# Patient Record
Sex: MALE | Race: WHITE | NOT HISPANIC OR LATINO | Employment: FULL TIME | URBAN - METROPOLITAN AREA
[De-identification: names, ages, dates, MRNs, and addresses within clinical notes are randomized per-mention and may not be internally consistent; named-entity substitution may affect disease eponyms.]

---

## 2023-11-03 PROBLEM — E66.9 OBESITY: Status: ACTIVE | Noted: 2023-11-03

## 2023-11-03 PROBLEM — E78.5 HYPERLIPIDEMIA: Status: ACTIVE | Noted: 2023-11-03

## 2023-11-03 PROBLEM — I25.84 CORONARY ARTERY CALCIFICATION: Status: ACTIVE | Noted: 2023-11-03

## 2023-11-03 PROBLEM — I25.10 CORONARY ARTERY CALCIFICATION: Status: ACTIVE | Noted: 2023-11-03

## 2023-11-03 PROBLEM — I10 ESSENTIAL HYPERTENSION: Status: ACTIVE | Noted: 2023-11-03

## 2023-11-03 PROBLEM — F41.9 ANXIETY: Status: ACTIVE | Noted: 2023-11-03

## 2023-11-03 PROBLEM — R79.89 ABNORMAL LIVER FUNCTION TEST: Status: ACTIVE | Noted: 2023-11-03

## 2023-11-03 RX ORDER — LISINOPRIL 20 MG/1
TABLET ORAL
COMMUNITY
Start: 2013-04-10

## 2023-11-03 RX ORDER — LEVOTHYROXINE SODIUM 25 UG/1
TABLET ORAL
COMMUNITY
Start: 2022-04-04

## 2023-11-03 RX ORDER — AMLODIPINE BESYLATE 10 MG/1
1 TABLET ORAL DAILY
COMMUNITY
Start: 2013-04-10

## 2023-11-03 RX ORDER — ATORVASTATIN CALCIUM 40 MG/1
1 TABLET, FILM COATED ORAL DAILY
COMMUNITY
Start: 2019-11-05

## 2023-11-03 RX ORDER — DOXAZOSIN 1 MG/1
1 TABLET ORAL DAILY
COMMUNITY
Start: 2021-03-08

## 2023-11-03 RX ORDER — OMEPRAZOLE 20 MG/1
TABLET, DELAYED RELEASE ORAL
COMMUNITY
Start: 2022-05-03

## 2023-11-03 RX ORDER — HYDROCHLOROTHIAZIDE 12.5 MG/1
1 CAPSULE ORAL DAILY
COMMUNITY
Start: 2020-11-23

## 2023-11-03 RX ORDER — AMMONIUM LACTATE 12 G/100G
LOTION TOPICAL
COMMUNITY
Start: 2022-04-04

## 2023-11-03 RX ORDER — GLUCOSAMINE/MSM/CHONDROIT SULF 500-166.6
1 TABLET ORAL DAILY
COMMUNITY
Start: 2019-11-05

## 2023-11-03 RX ORDER — CLONAZEPAM 0.5 MG/1
TABLET ORAL
COMMUNITY
Start: 2013-04-10

## 2023-11-03 RX ORDER — ASPIRIN 81 MG/1
1 TABLET ORAL DAILY
COMMUNITY
Start: 2019-11-05

## 2023-11-10 ENCOUNTER — OFFICE VISIT (OUTPATIENT)
Dept: HEMATOLOGY/ONCOLOGY | Facility: CLINIC | Age: 63
End: 2023-11-10
Payer: MEDICARE

## 2023-11-10 VITALS
HEIGHT: 65 IN | OXYGEN SATURATION: 97 % | BODY MASS INDEX: 31.88 KG/M2 | RESPIRATION RATE: 20 BRPM | TEMPERATURE: 98.2 F | SYSTOLIC BLOOD PRESSURE: 134 MMHG | DIASTOLIC BLOOD PRESSURE: 82 MMHG | WEIGHT: 191.36 LBS | HEART RATE: 80 BPM

## 2023-11-10 DIAGNOSIS — D47.2 MONOCLONAL GAMMOPATHY: Primary | ICD-10-CM

## 2023-11-10 PROCEDURE — 99214 OFFICE O/P EST MOD 30 MIN: CPT | Performed by: INTERNAL MEDICINE

## 2023-11-10 PROCEDURE — 1125F AMNT PAIN NOTED PAIN PRSNT: CPT | Performed by: INTERNAL MEDICINE

## 2023-11-10 PROCEDURE — 3075F SYST BP GE 130 - 139MM HG: CPT | Performed by: INTERNAL MEDICINE

## 2023-11-10 PROCEDURE — 1159F MED LIST DOCD IN RCRD: CPT | Performed by: INTERNAL MEDICINE

## 2023-11-10 PROCEDURE — 3079F DIAST BP 80-89 MM HG: CPT | Performed by: INTERNAL MEDICINE

## 2023-11-10 RX ORDER — LEVOTHYROXINE SODIUM 25 UG/1
TABLET ORAL DAILY
COMMUNITY

## 2023-11-10 ASSESSMENT — COLUMBIA-SUICIDE SEVERITY RATING SCALE - C-SSRS
2. HAVE YOU ACTUALLY HAD ANY THOUGHTS OF KILLING YOURSELF?: NO
6. HAVE YOU EVER DONE ANYTHING, STARTED TO DO ANYTHING, OR PREPARED TO DO ANYTHING TO END YOUR LIFE?: NO

## 2023-11-10 ASSESSMENT — PATIENT HEALTH QUESTIONNAIRE - PHQ9
1. LITTLE INTEREST OR PLEASURE IN DOING THINGS: NOT AT ALL
SUM OF ALL RESPONSES TO PHQ9 QUESTIONS 1 AND 2: 0
2. FEELING DOWN, DEPRESSED OR HOPELESS: NOT AT ALL

## 2023-11-10 ASSESSMENT — ENCOUNTER SYMPTOMS
LOSS OF SENSATION IN FEET: 0
DEPRESSION: 0
OCCASIONAL FEELINGS OF UNSTEADINESS: 0

## 2023-11-10 ASSESSMENT — PAIN SCALES - GENERAL: PAINLEVEL: 3

## 2023-11-10 NOTE — PROGRESS NOTES
LOCATION:    [facility] at [facility].     HEMATOLOGY & ONCOLOGY PROBLEMS:  1. Monoclonal gammopathy.      CHIEF COMPLAINT:    The patient is in the clinic today for management of monoclonal gammopathy.                   HISTORY:    Mr. Washington is a 63-year-old, pleasant gentleman with monoclonal gammopathy. He was noted to have 1.7 g of IgM kappa  monoclonal protein on SPEP in 2017.  CBC, metabolic profile, etc., were all normal.  Clinically, he has problem with generalized weakness and fatigue, but he attributes that mainly because of his history of anxiety.  No specific history of nausea,  vomiting, fever, diarrhea, rash, anorexia, or weight loss.     INTERVAL HISTORY:  Patient is being seen after a gap of 4 years. Overall he has been stable. Patient denies any new complaints. No history of nausea, vomiting, fever, night sweats, diarrhea, rash, anorexia or weight loss.  He has been followed very diligently by Dr. Howard and lately has been noted to have progressively rising kappa light chain level.  Latest blood work from 10/31/2023 showed a IgM level of 4226.  Free kappa light chain level is 31.91.  SPEP showed [place] faint IgM kappa monoclonal protein.  CBC and metabolic profile including calcium and creatinine have been unremarkable.  Complete skeletal survey was unremarkable on 2023.    PAST MEDICAL HISTORY:    1.  Hypertension  2.  Anxiety.  3.  Eczema.  4.  History of appendicectomy.    SOCIAL HISTORY:     and lives in [place]. His wife  in . They were  for 7 years.  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  Quit smoking 20 years ago.  Rare social alcohol intake.    FAMILY HISTORY:    Father  at age 84 from lymphoma.  Mother  at age 54 from myocardial infarction.  Four brothers, one of them  from drug overdose and two passed away due to cardiac issues.  His grandmother  at age 105 from cardiac-related  issues.  Two children in good health.      REVIEW OF SYSTEMS:    Pertinent finding as per the history above.  All other systems have been reviewed and generally negative and noncontributory.                                                                     PHYSICAL EXAMINATION:  Detailed examination not done.    Lab Results:  Latest blood work results at [facility] from 9/30/2023 were reviewed and have been detailed in the history above.  Most importantly his free kappa light chain level is elevated at 31.9.  It was 1.6 in 2019.    Assessment and Plan:    1.  IgG kappa monoclonal gammopathy. Please refer to the details of initial presentation and management as outlined above. In summary, the patient was noted  to have an incidental finding of 1.7 g of IgM kappa monoclonal protein. CBC, metabolic profile, calcium, etc., were all normal. Clinically, he is asymptomatic.      Clinically he is asymptomatic but lately his kappa light chain level has been noted to be progressively rising, as detailed above.  No evidence of any endorgan involvement with normal CBC, creatinine and calcium.  Skeletal survey was completely unremarkable on 9/30/2023.  I agree with Dr. Howard that he needs immediate further work-up.  I will schedule him for a  bone marrow biopsy for further evaluation.  I briefly reviewed the natural history of gammopathy including risk of transition from MGUS to myeloma.    2.  Follow-up.  He will return to the clinic immediately after the bone marrow biopsy.    This note has been transcribed using Dragon voice recognition system and there is a possibility of unintentional typing misprints.

## 2025-07-29 ENCOUNTER — LAB (OUTPATIENT)
Dept: LAB | Facility: CLINIC | Age: 65
End: 2025-07-29
Payer: MEDICARE

## 2025-07-29 DIAGNOSIS — D47.2 MONOCLONAL GAMMOPATHY: ICD-10-CM

## 2025-07-29 LAB
ALBUMIN SERPL BCP-MCNC: 4.2 G/DL (ref 3.4–5)
ALP SERPL-CCNC: 47 U/L (ref 33–136)
ALT SERPL W P-5'-P-CCNC: 45 U/L (ref 10–52)
ANION GAP SERPL CALC-SCNC: 14 MMOL/L (ref 10–20)
AST SERPL W P-5'-P-CCNC: 24 U/L (ref 9–39)
BASOPHILS # BLD AUTO: 0.08 X10*3/UL (ref 0–0.1)
BASOPHILS NFR BLD AUTO: 0.9 %
BILIRUB SERPL-MCNC: 0.6 MG/DL (ref 0–1.2)
BUN SERPL-MCNC: 10 MG/DL (ref 6–23)
CALCIUM SERPL-MCNC: 9.2 MG/DL (ref 8.6–10.3)
CHLORIDE SERPL-SCNC: 105 MMOL/L (ref 98–107)
CO2 SERPL-SCNC: 21 MMOL/L (ref 21–32)
CREAT SERPL-MCNC: 0.69 MG/DL (ref 0.5–1.3)
EGFRCR SERPLBLD CKD-EPI 2021: >90 ML/MIN/1.73M*2
EOSINOPHIL # BLD AUTO: 0.21 X10*3/UL (ref 0–0.7)
EOSINOPHIL NFR BLD AUTO: 2.4 %
ERYTHROCYTE [DISTWIDTH] IN BLOOD BY AUTOMATED COUNT: 13.6 % (ref 11.5–14.5)
GLUCOSE SERPL-MCNC: 101 MG/DL (ref 74–99)
HCT VFR BLD AUTO: 40 % (ref 41–52)
HGB BLD-MCNC: 14 G/DL (ref 13.5–17.5)
IGA SERPL-MCNC: 123 MG/DL (ref 70–400)
IGG SERPL-MCNC: 692 MG/DL (ref 700–1600)
IGM SERPL-MCNC: 3530 MG/DL (ref 40–230)
IMM GRANULOCYTES # BLD AUTO: 0.04 X10*3/UL (ref 0–0.7)
IMM GRANULOCYTES NFR BLD AUTO: 0.4 % (ref 0–0.9)
LYMPHOCYTES # BLD AUTO: 1.41 X10*3/UL (ref 1.2–4.8)
LYMPHOCYTES NFR BLD AUTO: 15.8 %
MCH RBC QN AUTO: 33.4 PG (ref 26–34)
MCHC RBC AUTO-ENTMCNC: 35 G/DL (ref 32–36)
MCV RBC AUTO: 96 FL (ref 80–100)
MONOCYTES # BLD AUTO: 0.85 X10*3/UL (ref 0.1–1)
MONOCYTES NFR BLD AUTO: 9.6 %
NEUTROPHILS # BLD AUTO: 6.31 X10*3/UL (ref 1.2–7.7)
NEUTROPHILS NFR BLD AUTO: 70.9 %
NRBC BLD-RTO: 0 /100 WBCS (ref 0–0)
PLATELET # BLD AUTO: 256 X10*3/UL (ref 150–450)
POTASSIUM SERPL-SCNC: 4 MMOL/L (ref 3.5–5.3)
PROT SERPL-MCNC: 8.6 G/DL (ref 6.4–8.2)
PROT SERPL-MCNC: 8.7 G/DL (ref 6.4–8.2)
RBC # BLD AUTO: 4.19 X10*6/UL (ref 4.5–5.9)
SODIUM SERPL-SCNC: 136 MMOL/L (ref 136–145)
WBC # BLD AUTO: 8.9 X10*3/UL (ref 4.4–11.3)

## 2025-07-29 PROCEDURE — 83521 IG LIGHT CHAINS FREE EACH: CPT | Mod: PARLAB

## 2025-07-29 PROCEDURE — 84165 PROTEIN E-PHORESIS SERUM: CPT | Performed by: STUDENT IN AN ORGANIZED HEALTH CARE EDUCATION/TRAINING PROGRAM

## 2025-07-29 PROCEDURE — 86334 IMMUNOFIX E-PHORESIS SERUM: CPT | Mod: PARLAB

## 2025-07-29 PROCEDURE — 82784 ASSAY IGA/IGD/IGG/IGM EACH: CPT | Mod: PARLAB

## 2025-07-29 PROCEDURE — 36415 COLL VENOUS BLD VENIPUNCTURE: CPT

## 2025-07-29 PROCEDURE — 85025 COMPLETE CBC W/AUTO DIFF WBC: CPT

## 2025-07-29 PROCEDURE — 80053 COMPREHEN METABOLIC PANEL: CPT

## 2025-07-29 PROCEDURE — 86320 SERUM IMMUNOELECTROPHORESIS: CPT | Performed by: STUDENT IN AN ORGANIZED HEALTH CARE EDUCATION/TRAINING PROGRAM

## 2025-07-29 PROCEDURE — 84155 ASSAY OF PROTEIN SERUM: CPT | Mod: 59,PARLAB

## 2025-07-30 LAB
KAPPA LC SERPL-MCNC: 2.51 MG/DL (ref 0.33–1.94)
KAPPA LC/LAMBDA SER: 4.33 {RATIO} (ref 0.26–1.65)
LAMBDA LC SERPL-MCNC: 0.58 MG/DL (ref 0.57–2.63)

## 2025-08-01 ENCOUNTER — OFFICE VISIT (OUTPATIENT)
Dept: HEMATOLOGY/ONCOLOGY | Facility: CLINIC | Age: 65
End: 2025-08-01
Payer: MEDICARE

## 2025-08-01 VITALS
RESPIRATION RATE: 20 BRPM | SYSTOLIC BLOOD PRESSURE: 148 MMHG | HEART RATE: 89 BPM | TEMPERATURE: 97.9 F | BODY MASS INDEX: 33.13 KG/M2 | WEIGHT: 201.28 LBS | DIASTOLIC BLOOD PRESSURE: 78 MMHG | OXYGEN SATURATION: 97 %

## 2025-08-01 DIAGNOSIS — D47.2 MONOCLONAL GAMMOPATHY: Primary | ICD-10-CM

## 2025-08-01 PROCEDURE — 99214 OFFICE O/P EST MOD 30 MIN: CPT | Performed by: INTERNAL MEDICINE

## 2025-08-01 PROCEDURE — 3078F DIAST BP <80 MM HG: CPT | Performed by: INTERNAL MEDICINE

## 2025-08-01 PROCEDURE — 1126F AMNT PAIN NOTED NONE PRSNT: CPT | Performed by: INTERNAL MEDICINE

## 2025-08-01 PROCEDURE — 1159F MED LIST DOCD IN RCRD: CPT | Performed by: INTERNAL MEDICINE

## 2025-08-01 PROCEDURE — 3077F SYST BP >= 140 MM HG: CPT | Performed by: INTERNAL MEDICINE

## 2025-08-01 ASSESSMENT — PAIN SCALES - GENERAL: PAINLEVEL_OUTOF10: 0-NO PAIN

## 2025-08-01 ASSESSMENT — ENCOUNTER SYMPTOMS
LOSS OF SENSATION IN FEET: 0
OCCASIONAL FEELINGS OF UNSTEADINESS: 0

## 2025-08-01 NOTE — PROGRESS NOTES
LOCATION:    [facility].     HEMATOLOGY & ONCOLOGY PROBLEMS:  1. Monoclonal gammopathy.      CHIEF COMPLAINT:    The patient is in the clinic today for management of monoclonal gammopathy.                   HISTORY:    Mr. Washington is a 65-year-old, pleasant gentleman with monoclonal gammopathy. He was noted to have 1.7g of IgM kappa  monoclonal protein on SPEP in 2017.  CBC, metabolic profile, etc., were all normal.  Clinically, he has problem with generalized weakness and fatigue, but he attributes that mainly because of his history of anxiety.  No specific history of nausea,  vomiting, fever, diarrhea, rash, anorexia, or weight loss.  Complete skeletal survey was unremarkable on 2023.     INTERVAL HISTORY:  Patient is being seen after a gap of 2 years. Overall he has been stable and denies any new complaints. No history of nausea, vomiting, fever, night sweats, diarrhea, rash, anorexia or weight loss.      PAST MEDICAL HISTORY:    1.  Hypertension  2.  Anxiety.  3.  Eczema.  4.  History of appendicectomy.    SOCIAL HISTORY:     and lives in [place]. His 2nd wife  in . They were  for 14 years.  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  Quit smoking 20 years ago.  Rare social alcohol intake.    FAMILY HISTORY:    Father  at age 84 from lymphoma.  Mother  at age 54 from myocardial infarction.  Four brothers, one of them  from drug overdose and two passed away due to cardiac issues.  His grandmother  at age 105 from cardiac-related  issues. 2 children and 1 grand daughter ~ in good health.     REVIEW OF SYSTEMS:    Pertinent finding as per the history above.  All other systems have been reviewed and generally negative and noncontributory.                                                                     PHYSICAL EXAMINATION:  Detailed examination not done.    LAB RESULTS:  Latest blood work results from 2025 showed  normal CBC and CMP.  Free kappa light chain level was stable at 2.51. SPEP is pending.    ASSESSMENT AND PLAN:    1.  IgG kappa monoclonal gammopathy. Please refer to the details of initial presentation and management as outlined above. In summary, the patient was noted  to have an incidental finding of 1.7 g of IgM kappa monoclonal protein. CBC, metabolic profile, calcium, etc., were all normal. Clinically, he is asymptomatic and has been followed by close observation.      He is being reevaluated after a gap of 2 years.  Latest follow-up CBC, metabolic profile and myeloma markers are stable.  Clinically he is asymptomatic.  For now we will continue to monitor him closely with regular physical examination and myeloma blood work at 6-month interval.    2.  Follow-up.  He will return to the clinic in 6 months.    This note has been transcribed using Dragon voice recognition system and there is a possibility of unintentional typing misprints.

## 2025-08-06 LAB
ALBUMIN: 4.1 G/DL (ref 3.4–5)
ALPHA 1 GLOBULIN: 0.3 G/DL (ref 0.2–0.6)
ALPHA 2 GLOBULIN: 0.7 G/DL (ref 0.4–1.1)
BETA GLOBULIN: 0.8 G/DL (ref 0.5–1.2)
GAMMA GLOBULIN: 2.9 G/DL (ref 0.5–1.4)
IMMUNOFIXATION COMMENT: ABNORMAL
M-PROTEIN 1: 1.9 G/DL (ref ?–0)
PATH REVIEW - SERUM IMMUNOFIXATION: ABNORMAL
PATH REVIEW-SERUM PROTEIN ELECTROPHORESIS: ABNORMAL
PROTEIN ELECTROPHORESIS COMMENT: ABNORMAL